# Patient Record
(demographics unavailable — no encounter records)

---

## 2024-10-08 NOTE — DISCUSSION/SUMMARY
[de-identified] : We had a thorough discussion regarding the nature of her pain, the pathophysiology, as well as all treatment options. I discussed operative and non-operative treatment modalities. Patient is 100% disabled although temporarily. Advise patient to follow up for a second opinion with Dr. Kennedy. All questions were answered and the patient verbalized understanding. The patient is in agreement with this treatment plan. Patient will follow up in 6-8 wks for repeat clinical assessment.

## 2024-10-08 NOTE — DISCUSSION/SUMMARY
[de-identified] : We had a thorough discussion regarding the nature of her pain, the pathophysiology, as well as all treatment options. I discussed operative and non-operative treatment modalities. Patient is 100% disabled although temporarily. Advise patient to follow up for a second opinion with Dr. Kennedy. All questions were answered and the patient verbalized understanding. The patient is in agreement with this treatment plan. Patient will follow up in 6-8 wks for repeat clinical assessment.

## 2024-10-08 NOTE — CONSULT LETTER
[Dear  ___] : Dear  [unfilled], [Consult Letter:] : I had the pleasure of evaluating your patient, [unfilled]. [Please see my note below.] : Please see my note below. [Sincerely,] : Sincerely, [FreeTextEntry3] : Dr. Brennan Dawson

## 2024-10-08 NOTE — ADDENDUM
[FreeTextEntry1] : Documented by Laura Uribe acting as a scribe for Dr. Dawson and Caden Washington PA-C on 10/08/2024. All medical record entries made by the Scribe were at my, Dr. Dawson, and Caden Washington's, direction and personally dictated by me on 10/08/2024. I have reviewed the chart and agree that the record accurately reflects my personal performance of the history, physical exam, procedure and imaging.

## 2024-10-08 NOTE — PHYSICAL EXAM
[de-identified] : Physical Exam:  General: Well appearing, no acute distress  Neurologic: A&Ox3, No focal deficits  Head: NCAT without abrasions, lacerations, or ecchymosis to head, face, or scalp  Eyes: No scleral icterus, no gross abnormalities  Respiratory: Equal chest wall expansion bilaterally, no accessory muscle use  Lymphatic: No lymphadenopathy palpated  Skin: Warm and dry  Psychiatric: Normal mood and affect  Examination of the Left knee reveals no significant effusion, erythema or ecchymosis.  There are no leg length discrepancies appreciated. The Left knee is slightly larger than the right.  The patient's range of motion is from 0-115 limited by pain with crepitus through the motion.  The patient has no pain with patella mobility or apprehension.  The patient displays tenderness to palpation in the medial and lateral joint lines but more so in the medial joint line.  There is no patella facet tenderness.  The patient has a 4.5 out of 5 strength resisted straight leg raising as well as knee flexion and extension.  The knee is stable to Lachman testing, as well as anterior and posterior drawer.  There is no valgus or varus instability. The patient has pain with Milla and Grind Testing.  The calf and thigh are soft, supple and nontender.  The patient is grossly neurovascularly intact distally.  [de-identified] : No new imaging.

## 2024-10-08 NOTE — HISTORY OF PRESENT ILLNESS
[de-identified] : YADIEL is a 47 year female here today for follow up due to left knee pain. he has been attending PT. She is currently out of work due to her injury. She notes her knee instability is worst when walking downstairs. SPO Left knee arthroscopy, partial lateral meniscectomy. DOS: 12/20/23. Patient had a left knee corticosteroid injection at her last visit on 07/25/2024. States that the corticosteroid injection did not provide any relief to her symptoms.

## 2024-10-08 NOTE — PHYSICAL EXAM
[de-identified] : Physical Exam:  General: Well appearing, no acute distress  Neurologic: A&Ox3, No focal deficits  Head: NCAT without abrasions, lacerations, or ecchymosis to head, face, or scalp  Eyes: No scleral icterus, no gross abnormalities  Respiratory: Equal chest wall expansion bilaterally, no accessory muscle use  Lymphatic: No lymphadenopathy palpated  Skin: Warm and dry  Psychiatric: Normal mood and affect  Examination of the Left knee reveals no significant effusion, erythema or ecchymosis.  There are no leg length discrepancies appreciated. The Left knee is slightly larger than the right.  The patient's range of motion is from 0-115 limited by pain with crepitus through the motion.  The patient has no pain with patella mobility or apprehension.  The patient displays tenderness to palpation in the medial and lateral joint lines but more so in the medial joint line.  There is no patella facet tenderness.  The patient has a 4.5 out of 5 strength resisted straight leg raising as well as knee flexion and extension.  The knee is stable to Lachman testing, as well as anterior and posterior drawer.  There is no valgus or varus instability. The patient has pain with Milla and Grind Testing.  The calf and thigh are soft, supple and nontender.  The patient is grossly neurovascularly intact distally.  [de-identified] : No new imaging.

## 2024-10-08 NOTE — HISTORY OF PRESENT ILLNESS
[de-identified] : YADIEL is a 47 year female here today for follow up due to left knee pain. he has been attending PT. She is currently out of work due to her injury. She notes her knee instability is worst when walking downstairs. SPO Left knee arthroscopy, partial lateral meniscectomy. DOS: 12/20/23. Patient had a left knee corticosteroid injection at her last visit on 07/25/2024. States that the corticosteroid injection did not provide any relief to her symptoms.

## 2024-10-08 NOTE — REASON FOR VISIT
[Follow-Up Visit] : a follow-up visit for [Workers' Comp: Date of Injury: _______] : This visit is related to worker's compensation. Date of Injury: [unfilled] [Knee Pain] : knee pain [FreeTextEntry2] : L knee

## 2024-10-21 NOTE — HISTORY OF PRESENT ILLNESS
[de-identified] : 10/21/2024 : YADIEL CALERO  is a 47 year  old female who presents to the office for a second opinion of her left knee.  She states she had a partial meniscectomy performed by Dr. Dawson in December 2023.  She states she did well initially but subsequently developed pain and swelling in the knee mostly over the anterior lateral aspect of the knee.  She states she gets clicking, popping, locking and buckling of the knee with certain activities and motions.  She states the knee is constantly swollen and aggravated and worse with certain activities and motions and alleviated with rest.  She states when she pivots she gets a lot of pain and buckling sensations as well as cracking sensations.  She is here for second opinion because she would like to avoid a second surgery but cannot take the pain and swelling anymore.  She denies any numbness or tingling distally.  She denies any fevers, chills, chest pain, shortness of breath.

## 2024-10-21 NOTE — DISCUSSION/SUMMARY
[de-identified] : Assessment: Patient 47-year-old female status post left knee arthroscopy with partial meniscectomy by  In December 2023 with recurrent lateral meniscus tear, patellofemoral syndrome    Plan: I had a long discussion with the patient today regarding the nature of their diagnosis and treatment plan. We discussed the risks and benefits of no treatment as well as nonoperative and operative treatments.  I reviewed the MRI which revealed a retear of the lateral meniscus with mild partial-thickness cartilage loss in the lateral compartment as scribed above.  On examination today her point of maximal tenderness seems to be over the patellofemoral compartment anteriorly with mild pain laterally.  Surgical and nonsurgical options were discussed in depth.  At this time I am recommending she exhaust conservative treatment including ice, heat, rest, over-the-counter anti-inflammatories, physical therapy and injections for symptomatic relief.  Since she has not had any improvement with cortisone injections that we did discuss potential gel injections to help her tolerate the physical therapy to work on VMO and quadricep strengthening to offload stress of the patellofemoral compartment.  She will avoid exacerbating activities and I emphasized the importance of physical therapy both formally on her own for strengthening.  Patient seen and examined with Dr. Kennedy today.   The patient verbalizes their understanding and agrees with the plan.  All questions were answered to their satisfaction.

## 2024-10-21 NOTE — REASON FOR VISIT
[Initial Visit] : an initial visit for [Workers' Comp: Date of Injury: _______] : This visit is related to worker's compensation. Date of Injury: [unfilled] [FreeTextEntry2] : left knee

## 2024-10-21 NOTE — PHYSICAL EXAM
[de-identified] : General: Awake, alert, no acute distress, Patient was cooperative and appropriate during the examination.  The patient is of normal weight for height and age.  Walks without an antalgic gait.  Left knee Examination: Physical examination of the knee demonstrates normal skin without signs of skin changes or abnormalities. No erythema, warmth, with a mild effusion noted.   Sensation is intact to light touch L2-S1 Palpable DP/PT pulse EHL/FHL/TA/GSC motor function intact   Range of Motion 0-130 with mild pain at the terminal degrees of flexion   Strength Testing Quadriceps 4+/5 we compared to the contralateral side Hamstring 5/5 Patient is able to perform a straight leg raise without difficulty.   Palpation Not tender to palpation about the distal femur, proximal tibia, or patella No palpable defect appreciated in the quadriceps or patellar tendons Not tender to palpation of medial joint line Mildly tender to palpation of lateral joint line Exquisitely tender to palpation in the patella femoral region   Special Tests Anterior Drawer negative Posterior Drawer negative Lachman Exam negative No Varus or Valgus Laxity at 0 or 30 degrees of knee flexion  Milla's Test positive laterally Apley grind test positive laterally Active compression of the patella negative Lateral apprehension test negative Translation of the patella 2 quadrants with a firm endpoint Dial test negative [de-identified] : MRI of the left knee taken at  radiology on April 23, 2024 revealed a partial lateral meniscectomy with new peripheral long to no tear of the lateral meniscus posterior horn at the takeoff of the meniscal femoral ligament.  There is mild partial-thickness cartilage loss in the lateral compartment with a 2 mm chondral defect along the posterior aspect of the lateral femoral condyle which is unchanged.  There is a small joint effusion with a Baker's cyst

## 2025-04-01 NOTE — PHYSICAL EXAM
[de-identified] : Physical Exam:  General: Well appearing, no acute distress  Neurologic: A&Ox3, No focal deficits  Head: NCAT without abrasions, lacerations, or ecchymosis to head, face, or scalp  Eyes: No scleral icterus, no gross abnormalities  Respiratory: Equal chest wall expansion bilaterally, no accessory muscle use  Lymphatic: No lymphadenopathy palpated  Skin: Warm and dry  Psychiatric: Normal mood and affect  Examination of the Left knee reveals no significant effusion, erythema or ecchymosis.  There are no leg length discrepancies appreciated. The Left knee is slightly larger than the right.  The patient's range of motion is from 0-115 limited by pain with crepitus through the motion.  The patient has no pain with patella mobility or apprehension.  The patient displays tenderness to palpation in the medial and lateral joint lines but more so in the medial joint line.  There is no patella facet tenderness.  The patient has a 4.5 out of 5 strength resisted straight leg raising as well as knee flexion and extension.  The knee is stable to Lachman testing, as well as anterior and posterior drawer.  There is no valgus or varus instability. The patient has pain with Milla and Grind Testing.  The calf and thigh are soft, supple and nontender.  The patient is grossly neurovascularly intact distally.  Examination of the Right knee reveals no significant effusion, erythema or ecchymosis.  There are no leg length discrepancies appreciated. The patient's range of motion is from 0-130.  The knee is stable to Lachman testing, as well as anterior and posterior drawer.  There is no valgus or varus instability. The calf and thigh are soft, supple and nontender.  The patient is grossly neurovascularly intact distally.

## 2025-04-01 NOTE — HISTORY OF PRESENT ILLNESS
[de-identified] : The patient is a 47-year-old female who returns today for follow-up evaluation of her left knee arthroscopy and partial meniscectomy performed in December 2023.  She has been unable to return to work.  She complains of diffuse knee pain.  She is frustrated with her continued pain.  She was approved for HA injections in the left knee.  She continues to complain of right sided leg pain.

## 2025-04-01 NOTE — HISTORY OF PRESENT ILLNESS
[de-identified] : The patient is a 47-year-old female who returns today for follow-up evaluation of her left knee arthroscopy and partial meniscectomy performed in December 2023.  She has been unable to return to work.  She complains of diffuse knee pain.  She is frustrated with her continued pain.  She was approved for HA injections in the left knee.  She continues to complain of right sided leg pain.

## 2025-04-01 NOTE — DISCUSSION/SUMMARY
[de-identified] : I had a lengthy discussion with the patient regarding their condition.  We we will attempt to continue nonoperative management.  She had a second opinion with Dr. Gonzalez he who recommended the same.  She would like to proceed with Euflexxa injections to her left knee.  This was performed.  Postinjection instructions were given.  The patient will monitor for signs of infection and can apply cryotherapy or moist heat.  The patient will follow-up next week for their next injection. They will call us with any issues or concerns.  She currently demonstrates total temporary impairment.  She remains out of work.

## 2025-04-01 NOTE — PHYSICAL EXAM
[de-identified] : Physical Exam:  General: Well appearing, no acute distress  Neurologic: A&Ox3, No focal deficits  Head: NCAT without abrasions, lacerations, or ecchymosis to head, face, or scalp  Eyes: No scleral icterus, no gross abnormalities  Respiratory: Equal chest wall expansion bilaterally, no accessory muscle use  Lymphatic: No lymphadenopathy palpated  Skin: Warm and dry  Psychiatric: Normal mood and affect  Examination of the Left knee reveals no significant effusion, erythema or ecchymosis.  There are no leg length discrepancies appreciated. The Left knee is slightly larger than the right.  The patient's range of motion is from 0-115 limited by pain with crepitus through the motion.  The patient has no pain with patella mobility or apprehension.  The patient displays tenderness to palpation in the medial and lateral joint lines but more so in the medial joint line.  There is no patella facet tenderness.  The patient has a 4.5 out of 5 strength resisted straight leg raising as well as knee flexion and extension.  The knee is stable to Lachman testing, as well as anterior and posterior drawer.  There is no valgus or varus instability. The patient has pain with Milla and Grind Testing.  The calf and thigh are soft, supple and nontender.  The patient is grossly neurovascularly intact distally.  Examination of the Right knee reveals no significant effusion, erythema or ecchymosis.  There are no leg length discrepancies appreciated. The patient's range of motion is from 0-130.  The knee is stable to Lachman testing, as well as anterior and posterior drawer.  There is no valgus or varus instability. The calf and thigh are soft, supple and nontender.  The patient is grossly neurovascularly intact distally.

## 2025-04-01 NOTE — PROCEDURE
[de-identified] : Injection: US guided Left knee joint. Indication: Osteoarthritis.  A discussion was had with the patient regarding this procedure and all questions were answered. All risks, benefits and alternatives were discussed. These included but were not limited to bleeding, infection, and allergic reaction. Alcohol was used to clean the skin, and chlorhexidine was used to sterilize and prep the area in the supero-lateral aspect of the Left knee. Ethyl chloride spray was then used as a topical anesthetic. A 22-gauge needle was used to inject 2 cc of Euflexxa into the Left knee. Ultrasound guidance was used for adequate placement of needle. A sterile bandage was then applied. The patient tolerated the procedure well and there were no complications. Post injection instructions were given.

## 2025-04-01 NOTE — PROCEDURE
[de-identified] : Injection: US guided Left knee joint. Indication: Osteoarthritis.  A discussion was had with the patient regarding this procedure and all questions were answered. All risks, benefits and alternatives were discussed. These included but were not limited to bleeding, infection, and allergic reaction. Alcohol was used to clean the skin, and chlorhexidine was used to sterilize and prep the area in the supero-lateral aspect of the Left knee. Ethyl chloride spray was then used as a topical anesthetic. A 22-gauge needle was used to inject 2 cc of Euflexxa into the Left knee. Ultrasound guidance was used for adequate placement of needle. A sterile bandage was then applied. The patient tolerated the procedure well and there were no complications. Post injection instructions were given.

## 2025-04-01 NOTE — DISCUSSION/SUMMARY
[de-identified] : I had a lengthy discussion with the patient regarding their condition.  We we will attempt to continue nonoperative management.  She had a second opinion with Dr. Gonzalez he who recommended the same.  She would like to proceed with Euflexxa injections to her left knee.  This was performed.  Postinjection instructions were given.  The patient will monitor for signs of infection and can apply cryotherapy or moist heat.  The patient will follow-up next week for their next injection. They will call us with any issues or concerns.  She currently demonstrates total temporary impairment.  She remains out of work.

## 2025-04-08 NOTE — HISTORY OF PRESENT ILLNESS
[de-identified] : The patient is a 47-year-old female who returns today for her second round of left knee Euflexxa injections.  She denies any change in her symptoms.  She denies fevers or chills.

## 2025-04-08 NOTE — PROCEDURE
[de-identified] : Injection: US guided Left knee joint. Indication: Osteoarthritis.  A discussion was had with the patient regarding this procedure and all questions were answered. All risks, benefits and alternatives were discussed. These included but were not limited to bleeding, infection, and allergic reaction. Alcohol was used to clean the skin, and chlorhexidine was used to sterilize and prep the area in the supero-lateral aspect of the Left knee. Ethyl chloride spray was then used as a topical anesthetic. A 22-gauge needle was used to inject 2 cc of Euflexxa into the Left knee. Ultrasound guidance was used for adequate placement of needle. A sterile bandage was then applied. The patient tolerated the procedure well and there were no complications. Post injection instructions were given.

## 2025-04-08 NOTE — DISCUSSION/SUMMARY
[de-identified] : I had a lengthy discussion with the patient regarding their condition.  We we will attempt to continue nonoperative management.  She had a second opinion with Dr. Gonzalez he who recommended the same.  She would like to proceed with Euflexxa injections to her left knee.  This was performed.  Postinjection instructions were given.  The patient will monitor for signs of infection and can apply cryotherapy or moist heat.  The patient will follow-up next week for their final injection. They will call us with any issues or concerns.  She currently demonstrates total temporary impairment.  She remains out of work.

## 2025-04-08 NOTE — PHYSICAL EXAM
[de-identified] : Physical Exam:  General: Well appearing, no acute distress  Neurologic: A&Ox3, No focal deficits  Head: NCAT without abrasions, lacerations, or ecchymosis to head, face, or scalp  Eyes: No scleral icterus, no gross abnormalities  Respiratory: Equal chest wall expansion bilaterally, no accessory muscle use  Lymphatic: No lymphadenopathy palpated  Skin: Warm and dry  Psychiatric: Normal mood and affect  The Left knee is examined and reveals no swelling, effusion, ecchymosis or erythema. Calf and thigh is soft and nontender. 2+PT pulses. Sensation is intact distally.

## 2025-04-22 NOTE — ADDENDUM
[FreeTextEntry1] : Documented by Laura Uribe acting as a scribe for Dr. Dawson on 04/22/2025. All medical record entries made by the Scribe were at my, Dr. Dawson's, direction and personally dictated by me on 04/22/2025. I have reviewed the chart and agree that the record accurately reflects my personal performance of the history, physical exam, procedure and imaging.

## 2025-04-22 NOTE — HISTORY OF PRESENT ILLNESS
[de-identified] : The patient is a 47-year-old female who returns today for her third round of left knee Euflexxa injections.  She denies any change in her symptoms.  She denies fevers or chills.

## 2025-04-22 NOTE — PHYSICAL EXAM
[de-identified] : Physical Exam:  General: Well appearing, no acute distress  Neurologic: A&Ox3, No focal deficits  Head: NCAT without abrasions, lacerations, or ecchymosis to head, face, or scalp  Eyes: No scleral icterus, no gross abnormalities  Respiratory: Equal chest wall expansion bilaterally, no accessory muscle use  Lymphatic: No lymphadenopathy palpated  Skin: Warm and dry  Psychiatric: Normal mood and affect  The Left knee is examined and reveals no swelling, effusion, ecchymosis or erythema. Calf and thigh is soft and nontender. 2+PT pulses. Sensation is intact distally.

## 2025-04-22 NOTE — PROCEDURE
[de-identified] : Injection: US guided Left knee joint. Indication: Osteoarthritis.  A discussion was had with the patient regarding this procedure and all questions were answered. All risks, benefits and alternatives were discussed. These included but were not limited to bleeding, infection, and allergic reaction. Alcohol was used to clean the skin, and chlorhexidine was used to sterilize and prep the area in the supero-lateral aspect of the Left knee. Ethyl chloride spray was then used as a topical anesthetic. A 22-gauge needle was used to inject 2 cc of Euflexxa into the Left knee. Ultrasound guidance was used for adequate placement of needle. A sterile bandage was then applied. The patient tolerated the procedure well and there were no complications. Post injection instructions were given.

## 2025-04-22 NOTE — REASON FOR VISIT
[Follow-Up Visit] : a follow-up visit for [Workers' Comp: Date of Injury: _______] : This visit is related to worker's compensation. Date of Injury: [unfilled] [FreeTextEntry2] : L knee

## 2025-04-22 NOTE — DISCUSSION/SUMMARY
[de-identified] : I had a lengthy discussion with the patient regarding their condition. Pt elected for third L knee Euflexxa injection at today's visit and tolerated the procedure well. She should take it easy for the next 2-3 days while icing the joint. Postinjection instructions were given.  The patient will monitor for signs of infection and can apply cryotherapy or moist heat. They will call us with any issues or concerns.  She currently demonstrates total temporary impairment.  She remains out of work. Patient will follow up as needed repeat clinical assessment.

## 2025-07-29 NOTE — DISCUSSION/SUMMARY
[de-identified] : A detailed discussion was held with the patient regarding her condition and current symptoms. She is advised to rest and limit activity for the next 2-3 days while applying ice to the affected joint to reduce inflammation. A compression knee sleeve will be provided to support the joint. Additionally, Zilreta has been recommended as part of her treatment regimen. The patient has been prescribed NSAIDs at a dosage of 800 mg to manage pain and inflammation. She currently demonstrates total temporary impairment and remains out of work. The patient was instructed to contact our office with any questions or concerns. Follow-up will be scheduled as needed for repeat clinical assessment.

## 2025-07-29 NOTE — HISTORY OF PRESENT ILLNESS
[de-identified] : Workers comp injury: Date of injury: 3/14/23 DOS: 12/20/23 Left knee arthroscopy, partial lateral meniscectomy. The patient is a 47-year-old female who returns today for a follow up from her last round of Euflexxa injections in April.  She states in the last week she has had worse pain than before. Denies no new physical activities, no new shoes, no new ADL. She states the pain is worse in flexion and that her knees swells. She is here today to see what other options are left.

## 2025-07-29 NOTE — ADDENDUM
[FreeTextEntry1] :  Documented by Chintan Beebe acting as a scribe for Dr. Dawson on 07/29/2025. All medical record entries made by the Scribe were at my, Dr. Dawson's, direction and personally dictated by me on 07/29/2025. I have reviewed the chart and agree that the record accurately reflects my personal performance of the history, physical exam, procedure and imaging.

## 2025-07-29 NOTE — DISCUSSION/SUMMARY
[de-identified] : A detailed discussion was held with the patient regarding her condition and current symptoms. She is advised to rest and limit activity for the next 2-3 days while applying ice to the affected joint to reduce inflammation. A compression knee sleeve will be provided to support the joint. Additionally, Zilreta has been recommended as part of her treatment regimen. The patient has been prescribed NSAIDs at a dosage of 800 mg to manage pain and inflammation. She currently demonstrates total temporary impairment and remains out of work. The patient was instructed to contact our office with any questions or concerns. Follow-up will be scheduled as needed for repeat clinical assessment.

## 2025-07-29 NOTE — HISTORY OF PRESENT ILLNESS
[de-identified] : Workers comp injury: Date of injury: 3/14/23 DOS: 12/20/23 Left knee arthroscopy, partial lateral meniscectomy. The patient is a 47-year-old female who returns today for a follow up from her last round of Euflexxa injections in April.  She states in the last week she has had worse pain than before. Denies no new physical activities, no new shoes, no new ADL. She states the pain is worse in flexion and that her knees swells. She is here today to see what other options are left.

## 2025-07-29 NOTE — PHYSICAL EXAM
[de-identified] : Physical Exam:  General: Well appearing, no acute distress  Neurologic: A&Ox3, No focal deficits  Head: NCAT without abrasions, lacerations, or ecchymosis to head, face, or scalp  Eyes: No scleral icterus, no gross abnormalities  Respiratory: Equal chest wall expansion bilaterally, no accessory muscle use  Lymphatic: No lymphadenopathy palpated  Skin: Warm and dry  Psychiatric: Normal mood and affect  The Left knee is examined and reveals no swelling, effusion, ecchymosis or erythema. Calf and thigh is soft and nontender. 2+PT pulses. Sensation is intact distally.   Posterior lateral and flexion are bothering her a bunch. [de-identified] : No new imaging.

## 2025-07-29 NOTE — PHYSICAL EXAM
[de-identified] : Physical Exam:  General: Well appearing, no acute distress  Neurologic: A&Ox3, No focal deficits  Head: NCAT without abrasions, lacerations, or ecchymosis to head, face, or scalp  Eyes: No scleral icterus, no gross abnormalities  Respiratory: Equal chest wall expansion bilaterally, no accessory muscle use  Lymphatic: No lymphadenopathy palpated  Skin: Warm and dry  Psychiatric: Normal mood and affect  The Left knee is examined and reveals no swelling, effusion, ecchymosis or erythema. Calf and thigh is soft and nontender. 2+PT pulses. Sensation is intact distally.   Posterior lateral and flexion are bothering her a bunch. [de-identified] : No new imaging.